# Patient Record
(demographics unavailable — no encounter records)

---

## 2025-05-21 NOTE — ASSESSMENT
[FreeTextEntry1] : Health Maintenance Her BMI is good and no weight loss is currently recommended. Daily aerobic exercises strongly recommended. No STD risk or substance abuse per patient report. Occasional gender specific self-examination is suggested. Hepatitis C antibody sent with patient approval. No depression. Competent with ADLs. Colonoscopy is not due this year. Yearly flu vaccine is recommended.

## 2025-05-21 NOTE — HISTORY OF PRESENT ILLNESS
[FreeTextEntry1] : 27-year-old female presents to establish medical care and for initial examination